# Patient Record
Sex: FEMALE | Race: WHITE | Employment: FULL TIME | URBAN - NONMETROPOLITAN AREA
[De-identification: names, ages, dates, MRNs, and addresses within clinical notes are randomized per-mention and may not be internally consistent; named-entity substitution may affect disease eponyms.]

---

## 2020-09-24 ENCOUNTER — HOSPITAL ENCOUNTER (EMERGENCY)
Age: 40
Discharge: HOME | End: 2020-09-24
Payer: COMMERCIAL

## 2020-09-24 VITALS
SYSTOLIC BLOOD PRESSURE: 144 MMHG | WEIGHT: 185 LBS | HEART RATE: 70 BPM | DIASTOLIC BLOOD PRESSURE: 88 MMHG | TEMPERATURE: 98.4 F | RESPIRATION RATE: 16 BRPM | OXYGEN SATURATION: 96 % | HEIGHT: 68 IN | BODY MASS INDEX: 28.04 KG/M2

## 2020-09-24 LAB — ALT SERPL-CCNC: 8 U/L (ref 11–66)

## 2020-09-24 PROCEDURE — 99203 OFFICE O/P NEW LOW 30 MIN: CPT

## 2020-09-24 PROCEDURE — 87389 HIV-1 AG W/HIV-1&-2 AB AG IA: CPT

## 2020-09-24 PROCEDURE — 84460 ALANINE AMINO (ALT) (SGPT): CPT

## 2020-09-24 PROCEDURE — 36415 COLL VENOUS BLD VENIPUNCTURE: CPT

## 2020-09-24 PROCEDURE — 86803 HEPATITIS C AB TEST: CPT

## 2020-09-24 PROCEDURE — 86706 HEP B SURFACE ANTIBODY: CPT

## 2020-09-24 PROCEDURE — 99203 OFFICE O/P NEW LOW 30 MIN: CPT | Performed by: NURSE PRACTITIONER

## 2020-09-24 ASSESSMENT — ENCOUNTER SYMPTOMS
COUGH: 0
SHORTNESS OF BREATH: 0

## 2020-09-24 NOTE — ED NOTES
Pt. Released in stable condition, ambulated per self to private car. Instructed pt to follow-up with [facility] health/family doctor as needed for recheck or go directly to the emergency department for any concerns/worsening conditions. Pt. Verbalized understanding of instructions. No questions at this time.       Shayna Jiménez RN  09/24/20 0011

## 2020-09-24 NOTE — ED PROVIDER NOTES
[id number]       Chief Complaint   Patient presents with    Body Fluid Exposure       Nurses Notes reviewed and I agree except as noted in the HPI. HISTORY OF PRESENT ILLNESS   Patel Good is a 38 y.o. female who presents for evaluation. She states that she gave a flu vaccine and then accidentally stuck her thumb while removing the injection from the patient while working at [employer] today at 11 AM.  States that she immediately cleansed the thumb. She states that her thumb did not even bleed although she was stuck. Tetnus is current. REVIEW OF SYSTEMS     Review of Systems   Constitutional: Negative for chills and fever. Respiratory: Negative for cough and shortness of breath. Cardiovascular: Negative for chest pain. Skin: Positive for wound (needle stick left thumb). Negative for rash. Allergic/Immunologic: Negative for environmental allergies and food allergies. Neurological: Negative for headaches. PAST MEDICAL HISTORY   History reviewed. No pertinent past medical history. SURGICAL HISTORY     Patient  has a past surgical history that includes Dilation and curettage of uterus. CURRENT MEDICATIONS       Previous Medications    ACETAMINOPHEN 650 MG TABS    Take 650 mg by mouth every 4 hours as needed for Pain (For mild pain level 1-3 or for fever > 100.5). IBUPROFEN (ADVIL;MOTRIN) 200 MG TABLET    Take 4 tablets by mouth every 8 hours as needed for Pain (For mild pain level 1-3). ALLERGIES     Patient is is allergic to pcn [penicillins] and sulfa antibiotics. FAMILY HISTORY     Patient'sfamily history includes Diabetes in her father; High Blood Pressure in her mother. SOCIAL HISTORY     Patient  reports that she has never smoked. She has never used smokeless tobacco. She reports that she does not drink alcohol or use drugs.     PHYSICAL EXAM     ED TRIAGE VITALS  BP: (!) 144/88, Temp: 98.4 °F (36.9 °C), Pulse: 70, Resp: 16, SpO2: 96 %  Physical Exam  Vitals signs and nursing note reviewed. Constitutional:       General: She is not in acute distress. Appearance: Normal appearance. She is well-developed. HENT:      Head: Normocephalic and atraumatic. Right Ear: External ear normal.      Left Ear: External ear normal.      Nose: Nose normal.      Mouth/Throat:      Lips: Pink. Mouth: Mucous membranes are moist.   Eyes:      Conjunctiva/sclera: Conjunctivae normal.      Right eye: Right conjunctiva is not injected. Left eye: Left conjunctiva is not injected. Pupils: Pupils are equal.   Neck:      Musculoskeletal: Normal range of motion. Cardiovascular:      Rate and Rhythm: Normal rate. Heart sounds: Normal heart sounds. Pulmonary:      Effort: Pulmonary effort is normal.      Breath sounds: Normal breath sounds. Skin:     General: Skin is warm and dry. Findings: Wound (Unable to visualize site of needlestick left thumb, no bleeding or drainage) present. No rash (on exposed surfaces). Neurological:      Mental Status: She is alert and oriented to person, place, and time. Psychiatric:         Speech: Speech normal.         Behavior: Behavior is cooperative. DIAGNOSTIC RESULTS   Labs:  Abnormal Labs Reviewed - No abnormal labs to display     IMAGING:  No orders to display     URGENT CARE COURSE:     Vitals:    09/24/20 1750   BP: (!) 144/88   Pulse: 70   Resp: 16   Temp: 98.4 °F (36.9 °C)   TempSrc: Infrared   SpO2: 96%   Weight: 185 lb (83.9 kg)   Height: 5' 8\" (1.727 m)       Medications - No data to display  PROCEDURES:  FINALIMPRESSION      1. Needlestick injury accident with exposure to body fluid    2.  Encounter related to worker's compensation claim        DISPOSITION/PLAN   DISPOSITION    Discharge        Problem List Items Addressed This Visit     None      Visit Diagnoses     Needlestick injury accident with exposure to body fluid    -  Primary    Relevant Orders    Hepatitis B surface antibody    Hepatitis C Antibody    ALT    HIV Screen    Encounter related to worker's compensation claim        Relevant Orders    Hepatitis B surface antibody    Hepatitis C Antibody    ALT    HIV Screen          Physical assessment findings, diagnostic testing(s) if applicable, and vital signs reviewed with patient/patient representative. Questions answered. If applicable, patient/patient representative will be contacted upon receipt of final culture and sensitivity or other testing results when available. Any additions or changes to medications or changes the plan of care will be made at that time. Medications as directed, including OTC medications for supportive care. Education provided on medications. Differential diagnosis(s) discussed with patient/patient representative. Home care/self care instructions reviewed with patient/patient representative. Patient has been advised to call occupational health in the morning to set up a follow-up appointment for the results of the labs for any further evaluation and management needed. Patient is to go to the emergency department if symptoms worsen. Patient/patient representative is aware of care plan, questions answered, verbalizes understanding and is in agreement. Teach back method used for patient/patient representative teaching(s) and printed instructions attached to after visit summary.     PATIENT REFERRED TO:  [address] [place] [id number]  [phone]  Schedule an appointment as soon as possible for a visit in 3 days  For further evaluation and test results      José Gutierrez, APRN - [id number] [facility], [facility] - GAYE  09/24/20 [address] , APRFELICIA [facility]  09/24/20 9901

## 2020-09-25 LAB
HBV SURFACE AB TITR SER: POSITIVE {TITER}
HEPATITIS C ANTIBODY: NEGATIVE

## 2020-09-28 LAB — HIV-2 AB: NEGATIVE

## 2020-10-01 ENCOUNTER — HOSPITAL ENCOUNTER (OUTPATIENT)
Age: 40
Discharge: HOME | End: 2020-10-01

## 2020-10-21 ENCOUNTER — HOSPITAL ENCOUNTER (OUTPATIENT)
Age: 40
Discharge: HOME | End: 2020-10-21

## 2020-10-25 LAB
HCV QNT BY NAAT INTERPRETATION: NOT DETECTED
HCV QNT BY NAAT IU/ML: NOT DETECTED IU/ML
HCV QNT BY NAAT LOG IU/ML: NOT DETECTED LOG IU/ML

## 2020-11-04 ENCOUNTER — NURSE ONLY (OUTPATIENT)
Dept: LAB | Age: 40
End: 2020-11-04

## 2021-11-23 ENCOUNTER — NURSE ONLY (OUTPATIENT)
Dept: LAB | Age: 41
End: 2021-11-23

## 2021-11-26 LAB
GENITAL CULTURE, ROUTINE: NORMAL
GRAM STAIN RESULT: NORMAL

## 2021-12-01 LAB — MISC. #1 REFERENCE GROUP TEST: NORMAL

## 2023-02-21 ENCOUNTER — NURSE ONLY (OUTPATIENT)
Dept: LAB | Age: 43
End: 2023-02-21

## 2023-03-03 LAB — CYTOLOGY THIN PREP PAP: NORMAL

## 2023-03-14 ENCOUNTER — HOSPITAL ENCOUNTER (OUTPATIENT)
Dept: WOMENS IMAGING | Age: 43
Discharge: HOME | End: 2023-03-14
Payer: COMMERCIAL

## 2023-03-14 DIAGNOSIS — Z12.31 VISIT FOR SCREENING MAMMOGRAM: ICD-10-CM

## 2023-03-14 PROCEDURE — 77063 BREAST TOMOSYNTHESIS BI: CPT

## 2023-03-16 ENCOUNTER — HOSPITAL ENCOUNTER (OUTPATIENT)
Dept: WOMENS IMAGING | Age: 43
Discharge: HOME | End: 2023-03-16
Payer: COMMERCIAL

## 2023-03-16 DIAGNOSIS — Z09 FOLLOW-UP EXAM: Primary | ICD-10-CM

## 2023-03-16 DIAGNOSIS — R92.2 BREAST DENSITY: ICD-10-CM

## 2023-03-16 PROCEDURE — 76642 ULTRASOUND BREAST LIMITED: CPT

## 2023-03-16 PROCEDURE — 77066 DX MAMMO INCL CAD BI: CPT

## 2024-06-19 ENCOUNTER — NURSE ONLY (OUTPATIENT)
Dept: LAB | Age: 44
End: 2024-06-19

## 2024-06-28 LAB — CYTOLOGY THIN PREP PAP: NORMAL

## 2025-08-25 ENCOUNTER — LAB (OUTPATIENT)
Dept: LAB | Age: 45
End: 2025-08-25

## 2025-08-27 ENCOUNTER — TRANSCRIBE ORDERS (OUTPATIENT)
Dept: ADMINISTRATIVE | Age: 45
End: 2025-08-27

## 2025-08-27 DIAGNOSIS — Z12.31 ENCOUNTER FOR SCREENING MAMMOGRAM FOR MALIGNANT NEOPLASM OF BREAST: Primary | ICD-10-CM

## 2025-09-07 LAB — CYTOLOGY THIN PREP PAP: NORMAL
